# Patient Record
Sex: MALE | Race: WHITE | NOT HISPANIC OR LATINO
[De-identification: names, ages, dates, MRNs, and addresses within clinical notes are randomized per-mention and may not be internally consistent; named-entity substitution may affect disease eponyms.]

---

## 2023-01-20 ENCOUNTER — APPOINTMENT (OUTPATIENT)
Dept: ORTHOPEDIC SURGERY | Facility: CLINIC | Age: 53
End: 2023-01-20
Payer: OTHER MISCELLANEOUS

## 2023-01-20 VITALS — HEIGHT: 70 IN | BODY MASS INDEX: 27.92 KG/M2 | WEIGHT: 195 LBS

## 2023-01-20 DIAGNOSIS — Z78.9 OTHER SPECIFIED HEALTH STATUS: ICD-10-CM

## 2023-01-20 DIAGNOSIS — M20.019 MALLET FINGER OF UNSPECIFIED FINGER(S): ICD-10-CM

## 2023-01-20 DIAGNOSIS — S69.90XA UNSPECIFIED INJURY OF UNSPECIFIED WRIST, HAND AND FINGER(S), INITIAL ENCOUNTER: ICD-10-CM

## 2023-01-20 PROCEDURE — 99072 ADDL SUPL MATRL&STAF TM PHE: CPT

## 2023-01-20 PROCEDURE — 99204 OFFICE O/P NEW MOD 45 MIN: CPT | Mod: 57

## 2023-01-20 PROCEDURE — 73140 X-RAY EXAM OF FINGER(S): CPT | Mod: LT

## 2023-01-20 PROCEDURE — 26432 REPAIR FINGER TENDON: CPT

## 2023-01-20 NOTE — ASSESSMENT
[FreeTextEntry1] : Left middle finger mallet - will manage with closed management [CPT 03051]\par \par Reviewed radiographs and pathonatomy with patient. Script for DIP extension splint provided. Explained we will manage with 24/7 DIP extension splint for 6 weeks and night time splinting for 2-3 weeks thereafter. Discussed that any flexion at DIP during the 6 weeks runs risk of breaking up scar tissue. Discussed expectation of 5-10 degree extension lag and stiffness.\par \par F/u 2 week for splint check OR at 6 weeks

## 2023-01-20 NOTE — HISTORY OF PRESENT ILLNESS
[de-identified] : 52M, RHD, No PMHX presents with left hand middle finger injury which occurred while working on 1/13/23. patient reports he was in a building reaching for a doorknob when the person on the other end pushed the door open jamming his finger. Admits to going to [facility] - no radiographs done. was told he had mallet finger, given a splint and told to follow up with orthopedics. Denies pain, denies numbness/tingling.

## 2023-01-20 NOTE — IMAGING
[de-identified] : Left middle finger with swelling, skin intact. Splint in place - did not remove (picture on phone with 40degree lag). Sensation intact at radial and ulnar aspects of pulp. <2sec cap refill.\par \par Left middle finger radiographs with no fracture nor dislocation.

## 2023-02-28 ENCOUNTER — APPOINTMENT (OUTPATIENT)
Dept: ORTHOPEDIC SURGERY | Facility: CLINIC | Age: 53
End: 2023-02-28
Payer: OTHER MISCELLANEOUS

## 2023-02-28 DIAGNOSIS — M20.012 MALLET FINGER OF LEFT FINGER(S): ICD-10-CM

## 2023-02-28 PROCEDURE — 99024 POSTOP FOLLOW-UP VISIT: CPT

## 2023-02-28 NOTE — IMAGING
[de-identified] : Left middle finger with swelling, skin intact. Splint in place - did not remove (picture on phone with 40degree lag). Sensation intact at radial and ulnar aspects of pulp. <2sec cap refill.\par \par Left middle finger radiographs with no fracture nor dislocation.

## 2023-02-28 NOTE — ASSESSMENT
[FreeTextEntry1] : Left middle finger mallet - will continue to manage with closed management [CPT 81618]\par \par Reviewed radiographs and pathonatomy with patient. Script for DIP extension splint provided. Explained we will manage with 24/7 DIP extension splint for 6 weeks and night time splinting for 2-3 weeks thereafter. Discussed that any flexion at DIP during the 6 weeks runs risk of breaking up scar tissue. Discussed expectation of 5-10 degree extension lag and stiffness.\par \par F/u 4-6 weeks

## 2023-02-28 NOTE — HISTORY OF PRESENT ILLNESS
[de-identified] : 52M, RHD, No PMHX presents with left hand middle finger injury which occurred while working on 1/13/23. patient reports he was in a building reaching for a doorknob when the person on the other end pushed the door open jamming his finger. Admits to going to [facility] - no radiographs done. was told he had mallet finger, given a splint and told to follow up with orthopedics. Denies pain, denies numbness/tingling. \par \par 2/28/23: f/u left hand middle finger. Reports no pain, denies numbness/tingling. Did have splint made a [facility] in [facility] and splint doesn't work well. Admits to purchasing his own online and works better.